# Patient Record
Sex: MALE | Race: WHITE
[De-identification: names, ages, dates, MRNs, and addresses within clinical notes are randomized per-mention and may not be internally consistent; named-entity substitution may affect disease eponyms.]

---

## 2021-12-17 NOTE — XR
EXAMINATION TYPE: XR ankle complete RT

 

DATE OF EXAM: 12/16/2021

 

COMPARISON: NONE

 

HISTORY: Pain

 

FINDINGS:

Three views of the ankle demonstrate the ankle mortise to be intact and symmetric.  The joint spaces 
are preserved.  The osseous structures are intact.  Spurring along the medial epicondyles. There is s
oft tissue edema and calcaneal spurs.

 

IMPRESSION:

1. No definite acute fracture or dislocation, if symptoms persist follow-up study in 7 to 10 days wou
ld be suggested.

## 2021-12-17 NOTE — XR
EXAMINATION TYPE: XR foot complete RT

 

DATE OF EXAM: 12/16/2021

 

COMPARISON: NONE

 

HISTORY: Pain

 

TECHNIQUE: Three views are submitted.

 

FINDINGS:

The osseous structures are intact.    There is no acute fracture or dislocation. Diffuse osteopenia. 
Arthropathy of the first MTP. Calcaneal spurs noted.

 

IMPRESSION:

1. No acute fractures symptoms persist EXAM 10-14 days recommended

## 2022-01-30 ENCOUNTER — HOSPITAL ENCOUNTER (OUTPATIENT)
Dept: HOSPITAL 47 - RADMRIMAIN | Age: 32
Discharge: HOME | End: 2022-01-30
Attending: PODIATRIST
Payer: COMMERCIAL

## 2022-01-30 DIAGNOSIS — M77.31: ICD-10-CM

## 2022-01-30 DIAGNOSIS — M19.071: Primary | ICD-10-CM

## 2022-01-30 NOTE — MR
EXAMINATION TYPE: MR ankle RT wo con

 

DATE OF EXAM: 1/30/2022

 

COMPARISON: None

 

HISTORY: Right ankle pain

 

Multiplanar multiecho imaging of the right ankle without contrast.

 

The Achilles tendon is intact. Plantar fascia appears intact. Medial and lateral flexor tendons of th
e ankle appear intact. Ankle mortise is anatomic. The talus is intact. The collateral ligaments appea
r intact. There is no evidence of any significant joint effusion. The bones of the hindfoot appear in
tact. There is mild plantar and Achilles calcaneal spurring. There is some spurring at the anterior t
alonavicular joint. The subtalar joint appears intact. There is no evidence of a soft tissue mass.

 

IMPRESSION:

There is some hypertrophic degenerative changes at the talonavicular joint. Calcaneal spurring. No ev
idence of ligamentous or tendon tear.

## 2022-05-27 ENCOUNTER — HOSPITAL ENCOUNTER (OUTPATIENT)
Dept: HOSPITAL 47 - OR | Age: 32
Discharge: HOME | End: 2022-05-27
Attending: PODIATRIST
Payer: COMMERCIAL

## 2022-05-27 VITALS — RESPIRATION RATE: 16 BRPM

## 2022-05-27 VITALS — BODY MASS INDEX: 48 KG/M2

## 2022-05-27 VITALS — HEART RATE: 60 BPM | DIASTOLIC BLOOD PRESSURE: 78 MMHG | SYSTOLIC BLOOD PRESSURE: 127 MMHG

## 2022-05-27 VITALS — TEMPERATURE: 96.9 F

## 2022-05-27 DIAGNOSIS — Q66.89: Primary | ICD-10-CM

## 2022-05-27 PROCEDURE — 76942 ECHO GUIDE FOR BIOPSY: CPT

## 2022-05-27 PROCEDURE — 64445 NJX AA&/STRD SCIATIC NRV IMG: CPT

## 2022-05-27 PROCEDURE — 28740 FUSION OF FOOT BONES: CPT

## 2022-05-27 PROCEDURE — 64447 NJX AA&/STRD FEMORAL NRV IMG: CPT

## 2022-05-27 PROCEDURE — 28725 ARTHRODESIS SUBTALAR: CPT

## 2022-05-27 NOTE — P.ANPRN
Procedure Note - Anesthesia





- Nerve Block Performed


  ** Right Popliteal Single


Time Out Performed: Yes


Date of Procedure: 05/27/22


Procedure Start Time: 07:02


Procedure Stop Time: 07:05


Location of Patient: PreOp


Indication: Acute Post-Operative Pain, Analgesia, Requested by Surgeon


Sedation Type: Sedate with meaningful contact maintained


Preparation: Sterile Prep


Position: Supine


Catheter: None


Needle Types: Pajunk


Needle Gauge: 20


Ultrasound used to visualize needle placement: Yes


Ultrasound used to observe medication spread: Yes


Injectate: 0.5% Ropivacaine (see comment for volume)


Blood Aspirated: No


Pain Paresthesia on Injection Noted: No


Resistance on Injection: Normal


Image Stored and Saved: Yes


Events: Uneventful and Well Tolerated

## 2022-05-27 NOTE — P.ANPRN
Procedure Note - Anesthesia





- Nerve Block Performed


  ** Right Adductor Canal Single


Time Out Performed: Yes


Date of Procedure: 05/27/22


Procedure Start Time: 06:53


Procedure Stop Time: 06:56


Location of Patient: PreOp


Indication: Acute Post-Operative Pain, Analgesia, Requested by Surgeon


Sedation Type: Sedate with meaningful contact maintained


Preparation: Sterile Prep


Position: Supine


Needle Types: Pajunk


Needle Gauge: 21


Ultrasound used to visualize needle placement: Yes


Ultrasound used to observe medication spread: Yes


Injectate: 0.5% Ropivacaine (see comment for volume) (15ml)


Blood Aspirated: No


Pain Paresthesia on Injection Noted: No


Resistance on Injection: Normal


Image Stored and Saved: Yes


Events: Uneventful and Well Tolerated

## 2022-05-27 NOTE — P.OP
Date of Procedure: 05/27/22


Preoperative Diagnosis: 


1.  Congenital subtalar coalition right foot


2.  Acquired deformity right foot


Postoperative Diagnosis: 


1.  Same


2.  Same


Procedure(s) Performed: 


Double arthrodesis right foot (talonavicular and subtalar joints)


Implants: 


7.0mm Novastep CREED screw x 2


5.0mm headless compression screw


20mm compression staple


Anesthesia: GETA


Estimated Blood Loss (ml): 3


Pathology: none sent


Condition: stable


Disposition: PACU


Description of Procedure: 


Prior to the patient being brought to the operating room anesthesia administered

a nerve block on the right lower extremity.  The patient was then brought into 

the operating room and placed on table in supine position.  Timeout was taken to

confirm correct patient identifiers, correct procedure and correct laterality of

surgery.  Once everyone in the room was in agreement with the timeout, the 

patient was induced and placed under general anesthesia.  A well-padded 

tourniquet was placed on the right thigh.  A bump was placed underneath the 

right hip to internally rotate the right leg and then the right leg was prepped 

and draped in usual manner.  The leg was exsanguinated with an Esmarch bandage 

and the leg elevated for 3 minutes and then the thigh tourniquet was inflated to

250 mmHg.


Attention was directed over the anterior lateral aspect of the hindfoot where a 

lazy S incision was made over the sinus tarsi.  Deepened under the subcutaneous 

tissue careful to identify, avoid, and retract any neurovascular structures and 

cauterize any bleeding vessels.  Blunt dissection was carried down to level of 

the subtalar joint capsule.  The capsule was incised and reflected to expose the

anterior portion subtalar joint.  Utilizing a combination of chisels and joint 

preparation instruments of the articular cartilage was removed from the 

calcaneus and talus.  Then a low-speed 3 mm bur was used to remove the rest 

articular cartilage and subchondral bone.  The same bur was also used to 

progressively fenestrate both surfaces to promote bleeding the area of bone 

healing.  The area was then thoroughly irrigated with sterile saline then 10 mL 

of demineralized bone matrix was placed between the arthrodesis segments.  Then 

guidewires for the 7.0 mm screws were positioned over the posterior plantar 

aspect the calcaneus.  Utilizing fluoroscopic visualization the pins were 

aligned starting just lateral to the midline and then angling slightly medial 

and anterior and superior to across the subtalar joint into the talar body.  

Once the first pin was and placed the first screw was positioned and tightened 

until the head engaged the calcaneus and all threads were clear the arthrodesis 

site to compress the subtalar joint.  Once that was completed fluoroscopy showed

that the subtalar joint was completely compressed.  A second wire for the same 

size screw was then placed plantar to the first again lateral to the midline and

ankle then anterior superior and medial direction.  The guidewire was directed 

towards the neck of the talus.  Once the pin was in place fluoroscopy was used 

to check positioning which was found to be appropriate and then another 7.0 mm 

screw was inserted across the guidewire and then fully tightened until the head 

engaged calcaneus and the distal threads were into the neck of the talus.  

Fluoroscopic imaging showed both screws were proper position both on the lateral

and AP views.  Then the subtalar joint was visually inspected and it was noted 

to be fully compressed.  The wound is irrigated with sterile saline.  Deep 

capsular closure was done with 0 Vicryl.  Superficial fascia closure done with 

2-0 Vicryl.  Subcu closure done for Monocryl.  Skin closure done with staples.


Then the wedge was removed from under the right hip so that the right foot 

externally rotate.  Then incision was made along the medial, foot over the 

talonavicular joint.  The incision was deepened down to the subcutaneous tissue 

careful to identify, avoid, and retract any neurovascular structures and 

cauterize any bleeding vessels.  Blunt dissection was continued down to level of

the fascia and capsule overlying the talonavicular joint.  The capsule and 

fascia was sharply incised and reflected dorsally and plantarly to expose the 

entirety of the joint.  A combination of ring preparation tools was used to 

remove the articular cartilage.  Then the same 3 mm low-speed bur was used to 

remove the remaining articular cartilage as well as the subchondral bone.  Once 

that was completed the joint was held in reduction and then the bur reinserted 

to plane the surfaces so there was flat bony contact.  Then the bur was used to 

progressively fenestrate both bony surfaces.  Once adequate resection was 

performed the wound is thoroughly irrigated with sterile saline.  Then 5 mL of 

demineralized bone matrix was placed between the arthrodesis segments.  Holding 

the talonavicular joint reduced a guidewire for 5.0 mm headless compression 

screw was placed at the navicular cuneiform joint medially and advanced across 

the talonavicular joint in a proximal dorsal and lateral direction.  Once the 

pin was inserted fluoroscopy was used to check the position both in AP and 

lateral views.  Once the pin was adequately placed drilling was done just past 

the arthrodesis site.  And then a 5.0 mm headless compression screw was placed 

over the wire and advanced until the threads on the head of the screw engaged 

the bone of the navicular and allowed for compression.  Fluoroscopy confirmed 

that the talonavicular joint was fully compressed and that the screw was fully 

seated.  Then drill holes for a 20 mm compression staple were placed dorsally 

along the talonavicular joint.  Once the drill holes were completed the staple 

was then inserted with the legs of the staple at 90.  Staple was fully seated 

and then the  released to allow for compression of the legs in stable.  

Then the staple was fully impacted so it sat flat against surface of the bone.  

Final fluoroscopic imaging showed that all hardware was properly placed and that

there was good compression noted at the arthrodesis sites.  The medial wound is 

flushed thoroughly with sterile saline.  Deep closure done with 0 Vicryl.  Subcu

closure was done with 4-0 Monocryl.  Skin closure was done with staples.  An 

Arthrex jumpstart dressings were placed over both incisions.  A bulky dry 

dressings applied to the right foot and ankle.  The tourniquet was released and 

capillary refill return to all digits on the right foot.  She was then placed in

a well-padded, well molded plaster posterior mold/sugar tong splint.  The foot 

and ankle were held in neutral position until the splint was fully dried.  At 

that point anesthesia was reversed and the patient was taken recovery with vital

signs stable.

## 2022-11-14 ENCOUNTER — HOSPITAL ENCOUNTER (EMERGENCY)
Dept: HOSPITAL 47 - EC | Age: 32
Discharge: HOME | End: 2022-11-14
Payer: COMMERCIAL

## 2022-11-14 VITALS — SYSTOLIC BLOOD PRESSURE: 154 MMHG | HEART RATE: 96 BPM | DIASTOLIC BLOOD PRESSURE: 86 MMHG

## 2022-11-14 VITALS — TEMPERATURE: 99.1 F

## 2022-11-14 VITALS — RESPIRATION RATE: 18 BRPM

## 2022-11-14 DIAGNOSIS — Z79.899: ICD-10-CM

## 2022-11-14 DIAGNOSIS — K21.9: ICD-10-CM

## 2022-11-14 DIAGNOSIS — Z87.891: ICD-10-CM

## 2022-11-14 DIAGNOSIS — I48.91: ICD-10-CM

## 2022-11-14 DIAGNOSIS — U07.1: Primary | ICD-10-CM

## 2022-11-14 LAB
ALBUMIN SERPL-MCNC: 4 G/DL (ref 3.5–5)
ALP SERPL-CCNC: 71 U/L (ref 38–126)
ALT SERPL-CCNC: 80 U/L (ref 4–49)
ANION GAP SERPL CALC-SCNC: 5 MMOL/L
APTT BLD: 24.4 SEC (ref 22–30)
AST SERPL-CCNC: 59 U/L (ref 17–59)
BASOPHILS # BLD AUTO: 0 K/UL (ref 0–0.2)
BASOPHILS NFR BLD AUTO: 0 %
BUN SERPL-SCNC: 12 MG/DL (ref 9–20)
CALCIUM SPEC-MCNC: 8.9 MG/DL (ref 8.4–10.2)
CHLORIDE SERPL-SCNC: 105 MMOL/L (ref 98–107)
CO2 SERPL-SCNC: 25 MMOL/L (ref 22–30)
EOSINOPHIL # BLD AUTO: 0.3 K/UL (ref 0–0.7)
EOSINOPHIL NFR BLD AUTO: 3 %
ERYTHROCYTE [DISTWIDTH] IN BLOOD BY AUTOMATED COUNT: 4.75 M/UL (ref 4.3–5.9)
ERYTHROCYTE [DISTWIDTH] IN BLOOD: 12.7 % (ref 11.5–15.5)
GLUCOSE SERPL-MCNC: 99 MG/DL (ref 74–99)
HCT VFR BLD AUTO: 39.2 % (ref 39–53)
HGB BLD-MCNC: 13 GM/DL (ref 13–17.5)
INR PPP: 0.9 (ref ?–1.2)
LYMPHOCYTES # SPEC AUTO: 0.6 K/UL (ref 1–4.8)
LYMPHOCYTES NFR SPEC AUTO: 8 %
MCH RBC QN AUTO: 27.4 PG (ref 25–35)
MCHC RBC AUTO-ENTMCNC: 33.3 G/DL (ref 31–37)
MCV RBC AUTO: 82.4 FL (ref 80–100)
MONOCYTES # BLD AUTO: 0.5 K/UL (ref 0–1)
MONOCYTES NFR BLD AUTO: 7 %
NEUTROPHILS # BLD AUTO: 6.3 K/UL (ref 1.3–7.7)
NEUTROPHILS NFR BLD AUTO: 80 %
PLATELET # BLD AUTO: 283 K/UL (ref 150–450)
POTASSIUM SERPL-SCNC: 4 MMOL/L (ref 3.5–5.1)
PROT SERPL-MCNC: 7 G/DL (ref 6.3–8.2)
PT BLD: 10.1 SEC (ref 9–12)
SODIUM SERPL-SCNC: 135 MMOL/L (ref 137–145)
WBC # BLD AUTO: 7.9 K/UL (ref 3.8–10.6)

## 2022-11-14 PROCEDURE — 99285 EMERGENCY DEPT VISIT HI MDM: CPT

## 2022-11-14 PROCEDURE — 93005 ELECTROCARDIOGRAM TRACING: CPT

## 2022-11-14 PROCEDURE — 80053 COMPREHEN METABOLIC PANEL: CPT

## 2022-11-14 PROCEDURE — 85730 THROMBOPLASTIN TIME PARTIAL: CPT

## 2022-11-14 PROCEDURE — 85610 PROTHROMBIN TIME: CPT

## 2022-11-14 PROCEDURE — 71046 X-RAY EXAM CHEST 2 VIEWS: CPT

## 2022-11-14 PROCEDURE — 83605 ASSAY OF LACTIC ACID: CPT

## 2022-11-14 PROCEDURE — 96375 TX/PRO/DX INJ NEW DRUG ADDON: CPT

## 2022-11-14 PROCEDURE — 85025 COMPLETE CBC W/AUTO DIFF WBC: CPT

## 2022-11-14 PROCEDURE — 87502 INFLUENZA DNA AMP PROBE: CPT

## 2022-11-14 PROCEDURE — 84484 ASSAY OF TROPONIN QUANT: CPT

## 2022-11-14 PROCEDURE — 36415 COLL VENOUS BLD VENIPUNCTURE: CPT

## 2022-11-14 PROCEDURE — 87635 SARS-COV-2 COVID-19 AMP PRB: CPT

## 2022-11-14 PROCEDURE — 96374 THER/PROPH/DIAG INJ IV PUSH: CPT

## 2022-11-14 NOTE — ED
SOB HPI





- General


Chief Complaint: Shortness of Breath


Stated Complaint: Chest Pain,SOB


Time Seen by Provider: 11/14/22 15:25


Source: patient


Mode of arrival: wheelchair


Limitations: no limitations





- History of Present Illness


Initial Comments: 


Patient is a 32-year-old male with past medical history of atrial fibrillation 

and SVT who presents to the emergency department with a chief complaint of 

shortness of breath.  Patient states symptoms started 4 days ago.  Reports 

shortness of breath is mostly exertional until today when he began to feel short

of breath at rest.  He also reports intermittent chest pain in the middle of his

chest.  Describes it as sharp and worse with coughing, sneezing, and breathing. 

It is nonexertional and nonradiating.  Denies lightheadedness, dizziness, 

palpitations.  Does admit to cough.  Denies other upper respiratory symptoms, 

fever, nausea, vomiting.  Patient does have history of asthma.  States he uses 

his rescue inhaler a couple times a year.  Denies past and present use of 

tobacco.  Denies family history of cardiac disease. 





- Related Data


                                Home Medications











 Medication  Instructions  Recorded  Confirmed


 


Albuterol Inhaler [Ventolin Hfa 1 - 2 puff INHALATION RT-Q6H PRN 05/19/22 11/14/22





Inhaler]   


 


Metoprolol Succinate (ER) [Toprol 50 mg PO DAILY 11/14/22 11/14/22





Xl]   








                                  Previous Rx's











 Medication  Instructions  Recorded


 


Ibuprofen [Motrin] 800 mg PO Q6HR #30 tab 11/14/22


 


methylPREDNISolone [Medrol Dose 4 mg PO AS DIRECTED #1 packet 11/14/22





Pack]  











                                    Allergies











Allergy/AdvReac Type Severity Reaction Status Date / Time


 


No Known Allergies Allergy   Verified 11/14/22 16:20














Review of Systems


ROS Statement: 


Those systems with pertinent positive or pertinent negative responses have been 

documented in the HPI.





ROS Other: All systems not noted in ROS Statement are negative.





Past Medical History


Past Medical History: Atrial Fibrillation, Asthma, GERD/Reflux, Supraventricular

Tachycardia (SVT)


History of Any Multi-Drug Resistant Organisms: None Reported


Past Surgical History: Cardiac Ablation


Past Anesthesia/Blood Transfusion Reactions: Motion Sickness


Past Psychological History: No Psychological Hx Reported


Smoking Status: Former smoker


Past Alcohol Use History: Rare


Past Drug Use History: None Reported





- Past Family History


  ** Father


Family Medical History: Cancer





General Exam


Limitations: no limitations


General appearance: alert, in no apparent distress


Respiratory exam: Present: normal lung sounds bilaterally.  Absent: respiratory 

distress, wheezes, rales, rhonchi, stridor, chest wall tenderness


Cardiovascular Exam: Present: regular rate, normal rhythm, normal heart sounds. 

Absent: systolic murmur, diastolic murmur, rubs, gallop, clicks


GI/Abdominal exam: Present: soft, normal bowel sounds.  Absent: distended, 

tenderness, guarding, rebound, rigid


Extremities exam: Present: normal inspection, normal capillary refill


Neurological exam: Present: alert, oriented X3, CN II-XII intact


Psychiatric exam: Present: normal affect, normal mood


Skin exam: Present: warm, dry, intact, normal color.  Absent: rash





Course


                                   Vital Signs











  11/14/22 11/14/22 11/14/22





  15:20 17:37 18:34


 


Temperature 99.4 F  


 


Pulse Rate 101 H 93 96


 


Respiratory 20 18 18





Rate   


 


Blood Pressure 158/93 189/98 154/86


 


O2 Sat by Pulse 100 99 98





Oximetry   














  11/14/22





  18:44


 


Temperature 99.1 F


 


Pulse Rate 


 


Respiratory 





Rate 


 


Blood Pressure 


 


O2 Sat by Pulse 





Oximetry 














Medical Decision Making





- Medical Decision Making


This is a 32-year-old male presenting with shortness of breath.  Patient looks 

well and in no apparent distress.  No hypoxia or increased work of breathing.  

No tachypnea or tachycardia.  No wheezing or other abnormal lung sounds.








EKG obtained and interpreted by me which shows sinus rhythm with no new ST 

segment or T-wave abnormalities. I did compare this EKG with previous in May.  

At this time patient was evaluated by cardiology due to incidental abnormal EKG 

findings.  These included ST elevation in the lateral leads and T-wave 

inversions in leads III and aVF.  These findings were found to be nonacute.  

Echocardiogram revealed EF 55-60% Patient was cleared by cardiology.








Laboratory studies obtained.  Troponin is within normal limits.  COVID-19 is 

detected.  Other laboratory studies were relatively unremarkable.  Chest x-ray 

obtained and interpreted by me which shows bronchitis and bilateral pleural 

based thickening.








Patient given Solu-Medrol and Toradol.  Results discussed with patient.  Patient

likely presenting chest pain related to pleurisy due to COVID-19 infection.  

He'll be discharged with Motrin 800 for pleurisy.  With his history of asthma 

I'll also send him home with a Medrol Dosepak.  Return parameters discussed.  

Patient verbalizes understanding and is medically stable for discharge.








Dr. Nelson is my attending.

















- Lab Data


Result diagrams: 


                                 11/14/22 15:40





                                 11/14/22 15:40


                                   Lab Results











  11/14/22 11/14/22 11/14/22 Range/Units





  15:40 15:40 15:40 


 


WBC  7.9    (3.8-10.6)  k/uL


 


RBC  4.75    (4.30-5.90)  m/uL


 


Hgb  13.0    (13.0-17.5)  gm/dL


 


Hct  39.2    (39.0-53.0)  %


 


MCV  82.4    (80.0-100.0)  fL


 


MCH  27.4    (25.0-35.0)  pg


 


MCHC  33.3    (31.0-37.0)  g/dL


 


RDW  12.7    (11.5-15.5)  %


 


Plt Count  283    (150-450)  k/uL


 


MPV  8.0    


 


Neutrophils %  80    %


 


Lymphocytes %  8    %


 


Monocytes %  7    %


 


Eosinophils %  3    %


 


Basophils %  0    %


 


Neutrophils #  6.3    (1.3-7.7)  k/uL


 


Lymphocytes #  0.6 L    (1.0-4.8)  k/uL


 


Monocytes #  0.5    (0-1.0)  k/uL


 


Eosinophils #  0.3    (0-0.7)  k/uL


 


Basophils #  0.0    (0-0.2)  k/uL


 


PT   10.1   (9.0-12.0)  sec


 


INR   0.9   (<1.2)  


 


APTT   24.4   (22.0-30.0)  sec


 


Sodium    135 L  (137-145)  mmol/L


 


Potassium    4.0  (3.5-5.1)  mmol/L


 


Chloride    105  ()  mmol/L


 


Carbon Dioxide    25  (22-30)  mmol/L


 


Anion Gap    5  mmol/L


 


BUN    12  (9-20)  mg/dL


 


Creatinine    0.83  (0.66-1.25)  mg/dL


 


Est GFR (CKD-EPI)AfAm    >90  (>60 ml/min/1.73 sqM)  


 


Est GFR (CKD-EPI)NonAf    >90  (>60 ml/min/1.73 sqM)  


 


Glucose    99  (74-99)  mg/dL


 


Plasma Lactic Acid Ant     (0.7-2.0)  mmol/L


 


Calcium    8.9  (8.4-10.2)  mg/dL


 


Total Bilirubin    0.5  (0.2-1.3)  mg/dL


 


AST    59  (17-59)  U/L


 


ALT    80 H  (4-49)  U/L


 


Alkaline Phosphatase    71  ()  U/L


 


Troponin I     (0.000-0.034)  ng/mL


 


Total Protein    7.0  (6.3-8.2)  g/dL


 


Albumin    4.0  (3.5-5.0)  g/dL


 


Coronavirus (PCR)     (Not Detectd)  


 


Influenza Type A RNA     (Not Detectd)  


 


Influenza Type B (PCR)     (Not Detectd)  














  11/14/22 11/14/22 11/14/22 Range/Units





  15:40 15:40 15:40 


 


WBC     (3.8-10.6)  k/uL


 


RBC     (4.30-5.90)  m/uL


 


Hgb     (13.0-17.5)  gm/dL


 


Hct     (39.0-53.0)  %


 


MCV     (80.0-100.0)  fL


 


MCH     (25.0-35.0)  pg


 


MCHC     (31.0-37.0)  g/dL


 


RDW     (11.5-15.5)  %


 


Plt Count     (150-450)  k/uL


 


MPV     


 


Neutrophils %     %


 


Lymphocytes %     %


 


Monocytes %     %


 


Eosinophils %     %


 


Basophils %     %


 


Neutrophils #     (1.3-7.7)  k/uL


 


Lymphocytes #     (1.0-4.8)  k/uL


 


Monocytes #     (0-1.0)  k/uL


 


Eosinophils #     (0-0.7)  k/uL


 


Basophils #     (0-0.2)  k/uL


 


PT     (9.0-12.0)  sec


 


INR     (<1.2)  


 


APTT     (22.0-30.0)  sec


 


Sodium     (137-145)  mmol/L


 


Potassium     (3.5-5.1)  mmol/L


 


Chloride     ()  mmol/L


 


Carbon Dioxide     (22-30)  mmol/L


 


Anion Gap     mmol/L


 


BUN     (9-20)  mg/dL


 


Creatinine     (0.66-1.25)  mg/dL


 


Est GFR (CKD-EPI)AfAm     (>60 ml/min/1.73 sqM)  


 


Est GFR (CKD-EPI)NonAf     (>60 ml/min/1.73 sqM)  


 


Glucose     (74-99)  mg/dL


 


Plasma Lactic Acid Ant  1.0    (0.7-2.0)  mmol/L


 


Calcium     (8.4-10.2)  mg/dL


 


Total Bilirubin     (0.2-1.3)  mg/dL


 


AST     (17-59)  U/L


 


ALT     (4-49)  U/L


 


Alkaline Phosphatase     ()  U/L


 


Troponin I   <0.012   (0.000-0.034)  ng/mL


 


Total Protein     (6.3-8.2)  g/dL


 


Albumin     (3.5-5.0)  g/dL


 


Coronavirus (PCR)     (Not Detectd)  


 


Influenza Type A RNA    Not Detected  (Not Detectd)  


 


Influenza Type B (PCR)    Not Detected  (Not Detectd)  














  11/14/22 Range/Units





  15:40 


 


WBC   (3.8-10.6)  k/uL


 


RBC   (4.30-5.90)  m/uL


 


Hgb   (13.0-17.5)  gm/dL


 


Hct   (39.0-53.0)  %


 


MCV   (80.0-100.0)  fL


 


MCH   (25.0-35.0)  pg


 


MCHC   (31.0-37.0)  g/dL


 


RDW   (11.5-15.5)  %


 


Plt Count   (150-450)  k/uL


 


MPV   


 


Neutrophils %   %


 


Lymphocytes %   %


 


Monocytes %   %


 


Eosinophils %   %


 


Basophils %   %


 


Neutrophils #   (1.3-7.7)  k/uL


 


Lymphocytes #   (1.0-4.8)  k/uL


 


Monocytes #   (0-1.0)  k/uL


 


Eosinophils #   (0-0.7)  k/uL


 


Basophils #   (0-0.2)  k/uL


 


PT   (9.0-12.0)  sec


 


INR   (<1.2)  


 


APTT   (22.0-30.0)  sec


 


Sodium   (137-145)  mmol/L


 


Potassium   (3.5-5.1)  mmol/L


 


Chloride   ()  mmol/L


 


Carbon Dioxide   (22-30)  mmol/L


 


Anion Gap   mmol/L


 


BUN   (9-20)  mg/dL


 


Creatinine   (0.66-1.25)  mg/dL


 


Est GFR (CKD-EPI)AfAm   (>60 ml/min/1.73 sqM)  


 


Est GFR (CKD-EPI)NonAf   (>60 ml/min/1.73 sqM)  


 


Glucose   (74-99)  mg/dL


 


Plasma Lactic Acid Ant   (0.7-2.0)  mmol/L


 


Calcium   (8.4-10.2)  mg/dL


 


Total Bilirubin   (0.2-1.3)  mg/dL


 


AST   (17-59)  U/L


 


ALT   (4-49)  U/L


 


Alkaline Phosphatase   ()  U/L


 


Troponin I   (0.000-0.034)  ng/mL


 


Total Protein   (6.3-8.2)  g/dL


 


Albumin   (3.5-5.0)  g/dL


 


Coronavirus (PCR)  Detected A  (Not Detectd)  


 


Influenza Type A RNA   (Not Detectd)  


 


Influenza Type B (PCR)   (Not Detectd)  














Disposition


Clinical Impression: 


 COVID-19, SOB (shortness of breath), Chest pain, Pleurisy





Disposition: HOME SELF-CARE


Condition: Good


Instructions (If sedation given, give patient instructions):  Coronavirus 

Disease 2019 (COVID-19), Pleurisy (ED)


Additional Instructions: 


Take medication as directed.  The steroid will be started tomorrow.  Quarantine 

for 5 days. Return to the emergency department experience new, concerning, or 

worsening symptoms.


Prescriptions: 


methylPREDNISolone [Medrol Dose Pack] 4 mg PO AS DIRECTED #1 packet


Ibuprofen [Motrin] 800 mg PO Q6HR #30 tab


Is patient prescribed a controlled substance at d/c from ED?: No


Referrals: 


None,Stated [Primary Care Provider] - 1-2 days


Time of Disposition: 17:52

## 2022-11-14 NOTE — XR
EXAMINATION TYPE: XR chest 2V

 

DATE OF EXAM: 11/14/2022

 

COMPARISON: NONE

 

TECHNIQUE: PA and lateral views submitted.

 

HISTORY: Chest pain

 

FINDINGS:

The lungs are clear and  there is no pneumothorax, pleural effusion, or focal pneumonia. Bilateral pl
eural thickening with coarsened interstitium. Heart size mildly prominent for the patient's age group
. Apical pleural thickening.

 

IMPRESSION:

1. Correlate for bronchitis or mild interstitial pneumonitis.

2. Bilateral pleural-based thickening is sometimes associated with pleurisy correlate clinically.

## 2022-11-19 ENCOUNTER — HOSPITAL ENCOUNTER (EMERGENCY)
Dept: HOSPITAL 47 - EC | Age: 32
Discharge: HOME | End: 2022-11-19
Payer: COMMERCIAL

## 2022-11-19 VITALS — DIASTOLIC BLOOD PRESSURE: 84 MMHG | TEMPERATURE: 98.9 F | HEART RATE: 81 BPM | SYSTOLIC BLOOD PRESSURE: 150 MMHG

## 2022-11-19 VITALS — RESPIRATION RATE: 18 BRPM

## 2022-11-19 DIAGNOSIS — Z87.891: ICD-10-CM

## 2022-11-19 DIAGNOSIS — J45.909: ICD-10-CM

## 2022-11-19 DIAGNOSIS — I48.91: ICD-10-CM

## 2022-11-19 DIAGNOSIS — Z79.899: ICD-10-CM

## 2022-11-19 DIAGNOSIS — K21.9: ICD-10-CM

## 2022-11-19 DIAGNOSIS — U07.1: Primary | ICD-10-CM

## 2022-11-19 PROCEDURE — 71046 X-RAY EXAM CHEST 2 VIEWS: CPT

## 2022-11-19 PROCEDURE — 93005 ELECTROCARDIOGRAM TRACING: CPT

## 2022-11-19 PROCEDURE — 99283 EMERGENCY DEPT VISIT LOW MDM: CPT

## 2022-11-19 NOTE — XR
EXAMINATION TYPE: XR chest 2V

 

DATE OF EXAM: 11/19/2022

 

COMPARISON: 11/14/2022

 

HISTORY: Chest pain

 

TECHNIQUE: 

 

FINDINGS:

Heart and mediastinum are normal. Lungs are clear. Diaphragm is normal. Bony thorax is intact. There 
is some pleural thickening right lateral chest wall that is likely lipomatosis.

 

 

IMPRESSION: Normal chest. No change.

## 2022-11-19 NOTE — ED
URI HPI





- General


Chief Complaint: Upper Respiratory Infection


Stated Complaint: covid +


Time Seen by Provider: 11/19/22 15:49


Source: patient, RN notes reviewed


Mode of arrival: ambulatory


Limitations: no limitations





- History of Present Illness


Initial Comments: 


Patient is a 32 year old male presenting to the ER with a chief complaint of 

lightheadedness and shakes. Patient was diagnosed with COVID-19 here on Monday, 

11/14/22. Patient reports he was sent home with steroids and ibuprofen. He has 

been taking them. He states he still has all the same symptoms as Monday 

including chills, nightsweats, dry cough, and congestion. He states today while 

he was walking up stairs he became short of breath and lightheaded. He also 

become very shaky which prompted his visit to the ER. He denies chest 

pain/palpitations, abdominal pain, or change in bowel habits. 








- Related Data


                                Home Medications











 Medication  Instructions  Recorded  Confirmed


 


Albuterol Inhaler [Ventolin Hfa 1 - 2 puff INHALATION RT-Q6H PRN 05/19/22 11/14/22





Inhaler]   


 


Metoprolol Succinate (ER) [Toprol 50 mg PO DAILY 11/14/22 11/14/22





Xl]   








                                  Previous Rx's











 Medication  Instructions  Recorded


 


Ibuprofen [Motrin] 800 mg PO Q6HR #30 tab 11/14/22


 


methylPREDNISolone [Medrol Dose 4 mg PO AS DIRECTED #1 packet 11/14/22





Pack]  











                                    Allergies











Allergy/AdvReac Type Severity Reaction Status Date / Time


 


No Known Allergies Allergy   Verified 11/19/22 15:45














Review of Systems


ROS Statement: 


Those systems with pertinent positive or pertinent negative responses have been 

documented in the HPI.





ROS Other: All systems not noted in ROS Statement are negative.





Past Medical History


Past Medical History: Atrial Fibrillation, Asthma, GERD/Reflux, Supraventricular

Tachycardia (SVT)


History of Any Multi-Drug Resistant Organisms: None Reported


Past Surgical History: Cardiac Ablation


Past Anesthesia/Blood Transfusion Reactions: Motion Sickness


Past Psychological History: No Psychological Hx Reported


Smoking Status: Former smoker


Past Alcohol Use History: Rare


Past Drug Use History: None Reported





- Past Family History


  ** Father


Family Medical History: Cancer





General Exam


Limitations: no limitations


General appearance: alert, in no apparent distress


Head exam: Present: atraumatic, normocephalic, normal inspection


Eye exam: Present: normal appearance, PERRL, EOMI.  Absent: scleral icterus, 

conjunctival injection, periorbital swelling


ENT exam: Present: normal exam, mucous membranes moist


Neck exam: Present: normal inspection.  Absent: tenderness, meningismus, 

lymphadenopathy


Respiratory exam: Present: normal lung sounds bilaterally, other (cough).  

Absent: respiratory distress, wheezes, rales, rhonchi, stridor


Cardiovascular Exam: Present: regular rate, normal rhythm, normal heart sounds. 

Absent: systolic murmur, diastolic murmur, rubs, gallop, clicks


GI/Abdominal exam: Present: soft, normal bowel sounds.  Absent: distended, 

tenderness, guarding, rebound, rigid


Extremities exam: Present: normal inspection, full ROM, normal capillary refill.

 Absent: tenderness, pedal edema, joint swelling, calf tenderness


Back exam: Present: normal inspection


Neurological exam: Present: alert, oriented X3, CN II-XII intact


Psychiatric exam: Present: normal affect, normal mood


Skin exam: Present: warm, dry, intact, normal color.  Absent: rash





Course


                                   Vital Signs











  11/19/22 11/19/22





  15:41 16:09


 


Temperature 98.6 F 


 


Pulse Rate 82 


 


Respiratory 18 18





Rate  


 


Blood Pressure 153/87 


 


O2 Sat by Pulse 98 





Oximetry  














Medical Decision Making





- Medical Decision Making


Chest x-rays unremarkable, EKG does not show any acute changes.  Patient had an 

episode of lightheadedness which has resolved.  He is asymptomatic.  Patient 

will be discharged in stable condition return parameters were discussed.  

Patient is provided a work no for tomorrow.





Disposition


Clinical Impression: 


 COVID-19, Lightheaded





Disposition: HOME SELF-CARE


Condition: Stable


Instructions (If sedation given, give patient instructions):  Upper Respiratory 

Infection (ED)


Additional Instructions: 


Please return to the Emergency Department if symptoms worsen or any other c

oncerns.


Is patient prescribed a controlled substance at d/c from ED?: No


Referrals: 


None,Stated [Primary Care Provider] - 1-2 days


Time of Disposition: 16:41

## 2024-09-14 ENCOUNTER — HOSPITAL ENCOUNTER (EMERGENCY)
Dept: HOSPITAL 47 - EC | Age: 34
Discharge: HOME | End: 2024-09-14
Payer: COMMERCIAL

## 2024-09-14 VITALS — SYSTOLIC BLOOD PRESSURE: 105 MMHG | DIASTOLIC BLOOD PRESSURE: 66 MMHG | HEART RATE: 70 BPM | TEMPERATURE: 98.5 F

## 2024-09-14 VITALS — RESPIRATION RATE: 18 BRPM

## 2024-09-14 LAB
ALBUMIN SERPL-MCNC: 3.8 G/DL (ref 3.5–5)
ALP SERPL-CCNC: 56 U/L (ref 38–126)
ALT SERPL-CCNC: 159 U/L (ref 4–49)
ANION GAP SERPL CALC-SCNC: 11 MMOL/L
APTT BLD: 28.7 SEC (ref 22–30)
AST SERPL-CCNC: 91 U/L (ref 17–59)
BASOPHILS # BLD AUTO: 0 K/UL (ref 0–0.2)
BASOPHILS NFR BLD AUTO: 0 %
BILIRUB UR QL STRIP.AUTO: (no result)
BUN SERPL-SCNC: 20 MG/DL (ref 9–20)
CALCIUM SPEC-MCNC: 9.1 MG/DL (ref 8.4–10.2)
CHLORIDE SERPL-SCNC: 92 MMOL/L (ref 98–107)
CO2 SERPL-SCNC: 28 MMOL/L (ref 22–30)
EOSINOPHIL # BLD AUTO: 0 K/UL (ref 0–0.7)
EOSINOPHIL NFR BLD AUTO: 0 %
ERYTHROCYTE [DISTWIDTH] IN BLOOD BY AUTOMATED COUNT: 5.16 M/UL (ref 4.3–5.9)
ERYTHROCYTE [DISTWIDTH] IN BLOOD: 12.8 % (ref 11.5–15.5)
GLUCOSE SERPL-MCNC: 118 MG/DL (ref 74–99)
HCT VFR BLD AUTO: 43 % (ref 39–53)
HGB BLD-MCNC: 14 GM/DL (ref 13–17.5)
HYALINE CASTS UR QL AUTO: 11 /LPF (ref 0–2)
INR PPP: 1 (ref ?–1.2)
LYMPHOCYTES # SPEC AUTO: 1.2 K/UL (ref 1–4.8)
LYMPHOCYTES NFR SPEC AUTO: 11 %
MAGNESIUM SPEC-SCNC: 1.8 MG/DL (ref 1.6–2.3)
MCH RBC QN AUTO: 27.2 PG (ref 25–35)
MCHC RBC AUTO-ENTMCNC: 32.6 G/DL (ref 31–37)
MCV RBC AUTO: 83.4 FL (ref 80–100)
MIXED CELL CASTS UR QL COMP ASSIST: 4 /LPF
MONOCYTES # BLD AUTO: 1 K/UL (ref 0–1)
MONOCYTES NFR BLD AUTO: 9 %
NEUTROPHILS # BLD AUTO: 8.6 K/UL (ref 1.3–7.7)
NEUTROPHILS NFR BLD AUTO: 77 %
NT-PROBNP SERPL-MCNC: <20 PG/ML
PH UR: 5.5 [PH] (ref 5–8)
PLATELET # BLD AUTO: 342 K/UL (ref 150–450)
POTASSIUM SERPL-SCNC: 3.6 MMOL/L (ref 3.5–5.1)
PROT SERPL-MCNC: 7.2 G/DL (ref 6.3–8.2)
PROT UR QL: (no result)
PT BLD: 11.3 SEC (ref 10–12.5)
RBC UR QL: 12 /HPF (ref 0–5)
SODIUM SERPL-SCNC: 131 MMOL/L (ref 137–145)
SP GR UR: 1.03 (ref 1–1.03)
SQUAMOUS UR QL AUTO: <1 /HPF (ref 0–4)
UROBILINOGEN UR QL STRIP: 6 MG/DL (ref ?–2)
WBC # BLD AUTO: 11.2 K/UL (ref 3.8–10.6)
WBC #/AREA URNS HPF: 8 /HPF (ref 0–5)

## 2024-09-14 PROCEDURE — 87636 SARSCOV2 & INF A&B AMP PRB: CPT

## 2024-09-14 PROCEDURE — 80053 COMPREHEN METABOLIC PANEL: CPT

## 2024-09-14 PROCEDURE — 83880 ASSAY OF NATRIURETIC PEPTIDE: CPT

## 2024-09-14 PROCEDURE — 96372 THER/PROPH/DIAG INJ SC/IM: CPT

## 2024-09-14 PROCEDURE — 87040 BLOOD CULTURE FOR BACTERIA: CPT

## 2024-09-14 PROCEDURE — 99284 EMERGENCY DEPT VISIT MOD MDM: CPT

## 2024-09-14 PROCEDURE — 85610 PROTHROMBIN TIME: CPT

## 2024-09-14 PROCEDURE — 84100 ASSAY OF PHOSPHORUS: CPT

## 2024-09-14 PROCEDURE — 81001 URINALYSIS AUTO W/SCOPE: CPT

## 2024-09-14 PROCEDURE — 85730 THROMBOPLASTIN TIME PARTIAL: CPT

## 2024-09-14 PROCEDURE — 84484 ASSAY OF TROPONIN QUANT: CPT

## 2024-09-14 PROCEDURE — 96365 THER/PROPH/DIAG IV INF INIT: CPT

## 2024-09-14 PROCEDURE — 87651 STREP A DNA AMP PROBE: CPT

## 2024-09-14 PROCEDURE — 83605 ASSAY OF LACTIC ACID: CPT

## 2024-09-14 PROCEDURE — 93005 ELECTROCARDIOGRAM TRACING: CPT

## 2024-09-14 PROCEDURE — 85025 COMPLETE CBC W/AUTO DIFF WBC: CPT

## 2024-09-14 PROCEDURE — 71046 X-RAY EXAM CHEST 2 VIEWS: CPT

## 2024-09-14 PROCEDURE — 36415 COLL VENOUS BLD VENIPUNCTURE: CPT

## 2024-09-14 PROCEDURE — 83735 ASSAY OF MAGNESIUM: CPT

## 2024-09-14 PROCEDURE — 96361 HYDRATE IV INFUSION ADD-ON: CPT

## 2024-09-14 RX ADMIN — ACETAMINOPHEN STA MG: 500 TABLET ORAL at 17:15

## 2024-09-14 RX ADMIN — ONDANSETRON STA MG: 4 TABLET, ORALLY DISINTEGRATING ORAL at 17:15

## 2024-09-14 RX ADMIN — AMPICILLIN SODIUM AND SULBACTAM SODIUM STA MLS/HR: 2; 1 INJECTION, POWDER, FOR SOLUTION INTRAMUSCULAR; INTRAVENOUS at 19:05

## 2024-09-14 RX ADMIN — KETOROLAC TROMETHAMINE STA MG: 15 INJECTION, SOLUTION INTRAMUSCULAR; INTRAVENOUS at 17:16

## 2024-09-14 RX ADMIN — CEFAZOLIN STA MLS/HR: 330 INJECTION, POWDER, FOR SOLUTION INTRAMUSCULAR; INTRAVENOUS at 18:16

## 2024-09-14 RX ADMIN — AMOXICILLIN AND CLAVULANATE POTASSIUM STA EACH: 875; 125 TABLET, FILM COATED ORAL at 19:06

## 2024-09-14 RX ADMIN — IBUPROFEN STA MG: 800 TABLET, FILM COATED ORAL at 17:15

## 2024-09-14 NOTE — XR
EXAMINATION TYPE: XR chest 2V

 

DATE OF EXAM: 9/14/2024

 

COMPARISON: 11/19/2022

 

HISTORY: 34-year-old male with fever and sore throat

 

TECHNIQUE:  AP and lateral views

 

FINDINGS:  

Heart upper limits of normal in size, likely accentuated due to technique. Hazy densities relating to
 body habitus. No consolidation or pleural effusion.

 

 

IMPRESSION:  

No acute process seen.

 

[facility], Workstation: [id number], 9/14/2024 6:40 PM

## 2024-09-14 NOTE — ED
Fever HPI





- General


Chief Complaint: Upper Respiratory Infection


Stated Complaint: sore throat, lethargy


Time Seen by Provider: 09/14/24 16:48


Source: patient, family, RN notes reviewed, old records reviewed


Mode of arrival: ambulatory


Limitations: no limitations





- History of Present Illness


Initial Comments: 





This is a 34-year-old male to ER for evaluation of fever and sore throat today. 

Patient was with friends 2 days ago playing some video games and he noticed he 

had a sore throat that been increasing since.  Patient states he feels sweaty 

with body aches and pains but no other acute complaints no cough congestion 

nausea vomiting or diarrhea


MD Complaint: fever, malaise, weakness, other (Sore throat)


Temperature Source: subjective


Context: sick contacts, multiple patients with similar symptoms


Associated Symptoms: chills, rigors, myalgias, sore throat


Treatments Prior to Arrival: none





- Related Data


                                Home Medications











 Medication  Instructions  Recorded  Confirmed


 


Albuterol Inhaler [Ventolin Hfa 1 - 2 puff INHALATION RT-Q6H PRN 05/19/22 11/14/22





Inhaler]   


 


Metoprolol Succinate (ER) [Toprol 50 mg PO DAILY 11/14/22 11/14/22





Xl]   








                                  Previous Rx's











 Medication  Instructions  Recorded


 


Ibuprofen [Motrin] 800 mg PO Q6HR #30 tab 11/14/22


 


methylPREDNISolone [Medrol Dose 4 mg PO AS DIRECTED #1 packet 11/14/22





Pack]  


 


Amoxic-Pot Clav 875-125Mg 1 tab PO BID 10 Days #20 tab 09/14/24





[Augmentin 875-125]  


 


Ketorolac [Toradol] 10 mg PO Q6HR #20 tab 09/18/24


 


Nystatin [Nystatin Oral Susp] 4 ml PO QID #200 ml 09/18/24











                                    Allergies











Allergy/AdvReac Type Severity Reaction Status Date / Time


 


No Known Allergies Allergy   Verified 09/18/24 16:01














Review of Systems


ROS Statement: 


Those systems with pertinent positive or pertinent negative responses have been 

documented in the HPI.





ROS Other: All systems not noted in ROS Statement are negative.





Past Medical History


Past Medical History: Atrial Fibrillation, Asthma, GERD/Reflux, Supraventricular

Tachycardia (SVT)


History of Any Multi-Drug Resistant Organisms: None Reported


Past Surgical History: Cardiac Ablation


Past Anesthesia/Blood Transfusion Reactions: Motion Sickness


Past Psychological History: No Psychological Hx Reported


Smoking Status: Former smoker


Past Alcohol Use History: Rare


Past Drug Use History: None Reported





- Past Family History


  ** Father


Family Medical History: Cancer





General Exam


Limitations: no limitations


General appearance: alert, in no apparent distress


Head exam: Present: atraumatic, normocephalic, normal inspection


Eye exam: Present: normal appearance, PERRL, EOMI.  Absent: scleral icterus, 

conjunctival injection, periorbital swelling


ENT exam: Present: normal exam, mucous membranes moist


Neck exam: Present: normal inspection.  Absent: tenderness, meningismus, 

lymphadenopathy


Respiratory exam: Present: normal lung sounds bilaterally.  Absent: respiratory 

distress, wheezes, rales, rhonchi, stridor


Cardiovascular Exam: Present: regular rate, normal rhythm, normal heart sounds. 

Absent: systolic murmur, diastolic murmur, rubs, gallop, clicks


GI/Abdominal exam: Present: soft, normal bowel sounds.  Absent: distended, 

tenderness, guarding, rebound, rigid


Extremities exam: Present: normal inspection, full ROM, normal capillary refill.

 Absent: tenderness, pedal edema, joint swelling, calf tenderness


Back exam: Present: normal inspection


Neurological exam: Present: alert, oriented X3, CN II-XII intact


Psychiatric exam: Present: normal affect, normal mood


Skin exam: Present: warm, dry, intact, normal color.  Absent: rash





Course


                                   Vital Signs











  09/14/24 09/14/24 09/14/24





  16:44 17:52 18:27


 


Temperature 102.5 F H 100.3 F H 


 


Pulse Rate 110 H 100 


 


Respiratory 20 20 20





Rate   


 


Blood Pressure 111/55  


 


O2 Sat by Pulse 97 95 





Oximetry   














  09/14/24 09/14/24





  18:38 19:27


 


Temperature 98.7 F 98.5 F


 


Pulse Rate 80 70


 


Respiratory 18 18





Rate  


 


Blood Pressure 120/68 105/66


 


O2 Sat by Pulse 96 96





Oximetry  














- Reevaluation(s)


Reevaluation #1: 





09/14/24 18:52


Medical records reviewed


Reevaluation #2: 





09/14/24 18:52


Patient symptoms improved


Reevaluation #3: 





09/14/24 18:53


Patient informed of results and questions answered


Reevaluation #4: 





Was pt. sent in by a medical professional or institution (, PA, NP, urgent 

care, hospital, or nursing home...) When possible be specific


@  -no


Did you speak to anyone other than the patient for history (EMS, parent, family,

police, friend...)? What history was obtained from this source 


@  -no


Did you review nursing and triage notes (agree or disagree)?  Why? 


@  -agree


Are old charts reviewed (outside hosp., previous admission, EMS record, old EKG,

old radiological studies, urgent care reports/EKG's, nursing home records)? 

Report findings 


@  -yes


Differential Diagnosis (chest pain, altered mental status, abdominal pain women,

abdominal pain men, vaginal bleeding, weakness, fever, dyspnea, syncope, 

headache, dizziness, GI bleed, back pain, seizure, CVA, palpatations, mental 

health, musculoskeletal)? 


@  -prior


EKG interpreted by me (3pts min.).


@  -yes


X-rays interpreted by me (1pt min.).


@  -yes negative for acute disease


CT interpreted by me (1pt min.).


@  -no


U/S interpreted by me (1pt. min.).


@  -no


What testing was considered but not performed or refused? (CT, X-rays, U/S, 

labs)? Why?


@  -none


What meds were considered but not given or refused? Why?


@  -none


Did you discuss the management of the patient with other professionals 

(professionals i.e. , PA, NP, lab, RT, psych nurse, , , 

teacher, , )? Give summary


@  -no


Was smoking cessation discussed for >3mins.?


@  -no


Was critical care preformed (if so, how long)?


@  -no


Were there social determinants of health that impacted care today? How? 

(Homelessness, low income, unemployed, alcoholism, drug addiction, 

transportation, low edu. Level, literacy, decrease access to med. care, senior living, 

rehab)?


@  -none


Was there de-escalation of care discussed even if they declined (Discuss DNR or 

withdrawal of care, Hospice)? DNR status


@  -no


What co-morbidities impacted this encounter? (DM, HTN, Smoking, COPD, CAD, 

Cancer, CVA, ARF, Chemo, Hep., AIDS, mental health diagnosis, sleep apnea, 

morbid obesity)?


@  -none


Was patient admitted / discharged? Hospital course, mention meds given and 

route, prescriptions, significant lab abnormalities, going to OR and other 

pertinent info.


@  - 34 male to the ER for evaluation of fever and sore throat, patient has 

negative viral testing negative strep but will treat with antibiotics for 

pharyngitis


Discharged


Undiagnosed new problem with uncertain prognosis?


@  -no


Drug Therapy requiring intensive monitoring for toxicity (Heparin, Nitro, 

Insulin, Cardizem)?


@  -no


Were any procedures done?


@  -no


Diagnosis/symptom?


@  -Pharyngitis


Acute, or Chronic, or Acute on Chronic?


@  -Acute


Uncomplicated (without systemic symptoms) or Complicated (systemic symptoms)?


@  -Complicated


Side effects of treatment?


@  -no


Exacerbation, Progression, or Severe Exacerbation?


@  -exacerbation


Poses a threat to life or bodily function? How? (Chest pain, USA, MI, pneumonia,

PE, COPD, DKA, ARF, appy, cholecystitis, CVA, Diverticulitis, Homicidal, 

Suicidal, threat to staff... and all critical care pts)


@  -no


Reevaluation #5: 





Differential Fever:


Pneumonia, viral URI, endocarditis, myocarditis, pericarditis, otitis, 

sinusitis, peritonsillar Abscess, retropharyngeal Abscess, epiglottitis, 

peritonitis, appendicitis, Macarena cystitis, diverticulitis, hepatitis, colitis, 

UTI, PID, TOA, pyelonephritis, prostatitis, epididymitis, meningitis, 

encephalitis, pulmonary embolism, CVA, thyroid storm, pancreatitis, adrenal 

crisis, cavernous sinus thrombosis, this is not meant to be an all-inclusive 

list. 








Medical Decision Making





- Medical Decision Making





4 male to the ER for evaluation of fever and sore throat, patient has negative 

viral testing negative strep but will treat with antibiotics for pharyngitis





- Lab Data


Result diagrams: 


                                 09/14/24 18:05





                                 09/14/24 18:05


                                   Lab Results











  09/14/24 09/14/24 09/14/24 Range/Units





  17:05 17:05 18:03 


 


WBC     (3.8-10.6)  k/uL


 


RBC     (4.30-5.90)  m/uL


 


Hgb     (13.0-17.5)  gm/dL


 


Hct     (39.0-53.0)  %


 


MCV     (80.0-100.0)  fL


 


MCH     (25.0-35.0)  pg


 


MCHC     (31.0-37.0)  g/dL


 


RDW     (11.5-15.5)  %


 


Plt Count     (150-450)  k/uL


 


MPV     


 


Neutrophils %     %


 


Lymphocytes %     %


 


Monocytes %     %


 


Eosinophils %     %


 


Basophils %     %


 


Neutrophils #     (1.3-7.7)  k/uL


 


Lymphocytes #     (1.0-4.8)  k/uL


 


Monocytes #     (0-1.0)  k/uL


 


Eosinophils #     (0-0.7)  k/uL


 


Basophils #     (0-0.2)  k/uL


 


PT     (10.0-12.5)  sec


 


INR     (<1.2)  


 


APTT     (22.0-30.0)  sec


 


Sodium     (137-145)  mmol/L


 


Potassium     (3.5-5.1)  mmol/L


 


Chloride     ()  mmol/L


 


Carbon Dioxide     (22-30)  mmol/L


 


Anion Gap     mmol/L


 


BUN     (9-20)  mg/dL


 


Creatinine     (0.66-1.25)  mg/dL


 


Est GFR (CKD-EPI)AfAm     (>60 ml/min/1.73 sqM)  


 


Est GFR (CKD-EPI)NonAf     (>60 ml/min/1.73 sqM)  


 


Glucose     (74-99)  mg/dL


 


Plasma Lactic Acid Ant     (0.7-2.0)  mmol/L


 


Calcium     (8.4-10.2)  mg/dL


 


Phosphorus     (2.5-4.5)  mg/dL


 


Magnesium     (1.6-2.3)  mg/dL


 


Total Bilirubin     (0.2-1.3)  mg/dL


 


AST     (17-59)  U/L


 


ALT     (4-49)  U/L


 


Alkaline Phosphatase     ()  U/L


 


Troponin I     (0.000-0.034)  ng/mL


 


NT-Pro-B Natriuret Pep     pg/mL


 


Total Protein     (6.3-8.2)  g/dL


 


Albumin     (3.5-5.0)  g/dL


 


Urine Color    Orange  


 


Urine Appearance    Cloudy  (Clear)  


 


Urine pH    5.5  (5.0-8.0)  


 


Ur Specific Gravity    1.028  (1.001-1.035)  


 


Urine Protein    2+ H  (Negative)  


 


Urine Glucose (UA)    Trace H  (Negative)  


 


Urine Ketones    Negative  (Negative)  


 


Urine Blood    Moderate H  (Negative)  


 


Urine Nitrite    Negative  (Negative)  


 


Urine Bilirubin    1+ H  (Negative)  


 


Urine Urobilinogen    6.0  (<2.0)  mg/dL


 


Ur Leukocyte Esterase    Negative  (Negative)  


 


Urine RBC    12 H  (0-5)  /hpf


 


Urine WBC    8 H  (0-5)  /hpf


 


Ur Squamous Epith Cells    <1  (0-4)  /hpf


 


Urine Bacteria    Rare H  (None)  /hpf


 


Cellular Casts    4  (0)  /lpf


 


Hyaline Casts    11 H  (0-2)  /lpf


 


Urine Mucus    Many H  (None)  /hpf


 


Influenza Type A (PCR)  Not Detected    (Not Detectd)  


 


Influenza Type B (PCR)  Not Detected    (Not Detectd)  


 


RSV (PCR)  Not Detected    (Not Detectd)  


 


SARS-CoV-2 (PCR)  Not Detected    (Not Detectd)  


 


Group A Strep (PCR)   NOT DETECTED   (Not Detectd)  














  09/14/24 09/14/24 09/14/24 Range/Units





  18:05 18:05 18:05 


 


WBC  11.2 H    (3.8-10.6)  k/uL


 


RBC  5.16    (4.30-5.90)  m/uL


 


Hgb  14.0    (13.0-17.5)  gm/dL


 


Hct  43.0    (39.0-53.0)  %


 


MCV  83.4    (80.0-100.0)  fL


 


MCH  27.2    (25.0-35.0)  pg


 


MCHC  32.6    (31.0-37.0)  g/dL


 


RDW  12.8    (11.5-15.5)  %


 


Plt Count  342    (150-450)  k/uL


 


MPV  7.4    


 


Neutrophils %  77    %


 


Lymphocytes %  11    %


 


Monocytes %  9    %


 


Eosinophils %  0    %


 


Basophils %  0    %


 


Neutrophils #  8.6 H    (1.3-7.7)  k/uL


 


Lymphocytes #  1.2    (1.0-4.8)  k/uL


 


Monocytes #  1.0    (0-1.0)  k/uL


 


Eosinophils #  0.0    (0-0.7)  k/uL


 


Basophils #  0.0    (0-0.2)  k/uL


 


PT   11.3   (10.0-12.5)  sec


 


INR   1.0   (<1.2)  


 


APTT   28.7   (22.0-30.0)  sec


 


Sodium    131 L  (137-145)  mmol/L


 


Potassium    3.6  (3.5-5.1)  mmol/L


 


Chloride    92 L  ()  mmol/L


 


Carbon Dioxide    28  (22-30)  mmol/L


 


Anion Gap    11  mmol/L


 


BUN    20  (9-20)  mg/dL


 


Creatinine    1.83 H  (0.66-1.25)  mg/dL


 


Est GFR (CKD-EPI)AfAm    54  (>60 ml/min/1.73 sqM)  


 


Est GFR (CKD-EPI)NonAf    47  (>60 ml/min/1.73 sqM)  


 


Glucose    118 H  (74-99)  mg/dL


 


Plasma Lactic Acid Ant     (0.7-2.0)  mmol/L


 


Calcium    9.1  (8.4-10.2)  mg/dL


 


Phosphorus    3.3  (2.5-4.5)  mg/dL


 


Magnesium    1.8  (1.6-2.3)  mg/dL


 


Total Bilirubin    1.3  (0.2-1.3)  mg/dL


 


AST    91 H  (17-59)  U/L


 


ALT    159 H  (4-49)  U/L


 


Alkaline Phosphatase    56  ()  U/L


 


Troponin I     (0.000-0.034)  ng/mL


 


NT-Pro-B Natriuret Pep    <20  pg/mL


 


Total Protein    7.2  (6.3-8.2)  g/dL


 


Albumin    3.8  (3.5-5.0)  g/dL


 


Urine Color     


 


Urine Appearance     (Clear)  


 


Urine pH     (5.0-8.0)  


 


Ur Specific Gravity     (1.001-1.035)  


 


Urine Protein     (Negative)  


 


Urine Glucose (UA)     (Negative)  


 


Urine Ketones     (Negative)  


 


Urine Blood     (Negative)  


 


Urine Nitrite     (Negative)  


 


Urine Bilirubin     (Negative)  


 


Urine Urobilinogen     (<2.0)  mg/dL


 


Ur Leukocyte Esterase     (Negative)  


 


Urine RBC     (0-5)  /hpf


 


Urine WBC     (0-5)  /hpf


 


Ur Squamous Epith Cells     (0-4)  /hpf


 


Urine Bacteria     (None)  /hpf


 


Cellular Casts     (0)  /lpf


 


Hyaline Casts     (0-2)  /lpf


 


Urine Mucus     (None)  /hpf


 


Influenza Type A (PCR)     (Not Detectd)  


 


Influenza Type B (PCR)     (Not Detectd)  


 


RSV (PCR)     (Not Detectd)  


 


SARS-CoV-2 (PCR)     (Not Detectd)  


 


Group A Strep (PCR)     (Not Detectd)  














  09/14/24 09/14/24 Range/Units





  18:05 18:05 


 


WBC    (3.8-10.6)  k/uL


 


RBC    (4.30-5.90)  m/uL


 


Hgb    (13.0-17.5)  gm/dL


 


Hct    (39.0-53.0)  %


 


MCV    (80.0-100.0)  fL


 


MCH    (25.0-35.0)  pg


 


MCHC    (31.0-37.0)  g/dL


 


RDW    (11.5-15.5)  %


 


Plt Count    (150-450)  k/uL


 


MPV    


 


Neutrophils %    %


 


Lymphocytes %    %


 


Monocytes %    %


 


Eosinophils %    %


 


Basophils %    %


 


Neutrophils #    (1.3-7.7)  k/uL


 


Lymphocytes #    (1.0-4.8)  k/uL


 


Monocytes #    (0-1.0)  k/uL


 


Eosinophils #    (0-0.7)  k/uL


 


Basophils #    (0-0.2)  k/uL


 


PT    (10.0-12.5)  sec


 


INR    (<1.2)  


 


APTT    (22.0-30.0)  sec


 


Sodium    (137-145)  mmol/L


 


Potassium    (3.5-5.1)  mmol/L


 


Chloride    ()  mmol/L


 


Carbon Dioxide    (22-30)  mmol/L


 


Anion Gap    mmol/L


 


BUN    (9-20)  mg/dL


 


Creatinine    (0.66-1.25)  mg/dL


 


Est GFR (CKD-EPI)AfAm    (>60 ml/min/1.73 sqM)  


 


Est GFR (CKD-EPI)NonAf    (>60 ml/min/1.73 sqM)  


 


Glucose    (74-99)  mg/dL


 


Plasma Lactic Acid Ant  1.3   (0.7-2.0)  mmol/L


 


Calcium    (8.4-10.2)  mg/dL


 


Phosphorus    (2.5-4.5)  mg/dL


 


Magnesium    (1.6-2.3)  mg/dL


 


Total Bilirubin    (0.2-1.3)  mg/dL


 


AST    (17-59)  U/L


 


ALT    (4-49)  U/L


 


Alkaline Phosphatase    ()  U/L


 


Troponin I   <0.012  (0.000-0.034)  ng/mL


 


NT-Pro-B Natriuret Pep    pg/mL


 


Total Protein    (6.3-8.2)  g/dL


 


Albumin    (3.5-5.0)  g/dL


 


Urine Color    


 


Urine Appearance    (Clear)  


 


Urine pH    (5.0-8.0)  


 


Ur Specific Gravity    (1.001-1.035)  


 


Urine Protein    (Negative)  


 


Urine Glucose (UA)    (Negative)  


 


Urine Ketones    (Negative)  


 


Urine Blood    (Negative)  


 


Urine Nitrite    (Negative)  


 


Urine Bilirubin    (Negative)  


 


Urine Urobilinogen    (<2.0)  mg/dL


 


Ur Leukocyte Esterase    (Negative)  


 


Urine RBC    (0-5)  /hpf


 


Urine WBC    (0-5)  /hpf


 


Ur Squamous Epith Cells    (0-4)  /hpf


 


Urine Bacteria    (None)  /hpf


 


Cellular Casts    (0)  /lpf


 


Hyaline Casts    (0-2)  /lpf


 


Urine Mucus    (None)  /hpf


 


Influenza Type A (PCR)    (Not Detectd)  


 


Influenza Type B (PCR)    (Not Detectd)  


 


RSV (PCR)    (Not Detectd)  


 


SARS-CoV-2 (PCR)    (Not Detectd)  


 


Group A Strep (PCR)    (Not Detectd)  














- EKG Data


-: EKG Interpreted by Me (EKG is sinus 88   QTc 397)





- Radiology Data


Radiology results: report reviewed (Chest x-ray is negative for acute disease), 

image reviewed





Disposition


Clinical Impression: 


 Fever, Pharyngitis





Disposition: HOME SELF-CARE


Condition: Fair


Instructions (If sedation given, give patient instructions):  Pharyngitis (ED), 

Fever in Adults (ED)


Prescriptions: 


Amoxic-Pot Clav 875-125Mg [Augmentin 875-125] 1 tab PO BID 10 Days #20 tab


Is patient prescribed a controlled substance at d/c from ED?: No


Referrals: 


Roge Bradford MD [Primary Care Provider] - 1-2 days


Time of Disposition: 19:00

## 2024-09-18 ENCOUNTER — HOSPITAL ENCOUNTER (EMERGENCY)
Dept: HOSPITAL 47 - EC | Age: 34
Discharge: HOME | End: 2024-09-18
Payer: COMMERCIAL

## 2024-09-18 VITALS — SYSTOLIC BLOOD PRESSURE: 114 MMHG | DIASTOLIC BLOOD PRESSURE: 55 MMHG | HEART RATE: 80 BPM | TEMPERATURE: 98.8 F

## 2024-09-18 VITALS — RESPIRATION RATE: 18 BRPM

## 2024-09-18 LAB
ALBUMIN SERPL-MCNC: 4 G/DL (ref 3.5–5)
ALP SERPL-CCNC: 64 U/L (ref 38–126)
ALT SERPL-CCNC: 176 U/L (ref 4–49)
ANION GAP SERPL CALC-SCNC: 8 MMOL/L
AST SERPL-CCNC: 91 U/L (ref 17–59)
BASOPHILS # BLD AUTO: 0.1 K/UL (ref 0–0.2)
BASOPHILS NFR BLD AUTO: 1 %
BUN SERPL-SCNC: 13 MG/DL (ref 9–20)
CALCIUM SPEC-MCNC: 9.4 MG/DL (ref 8.4–10.2)
CHLORIDE SERPL-SCNC: 95 MMOL/L (ref 98–107)
CO2 SERPL-SCNC: 32 MMOL/L (ref 22–30)
EOSINOPHIL # BLD AUTO: 0.2 K/UL (ref 0–0.7)
EOSINOPHIL NFR BLD AUTO: 2 %
ERYTHROCYTE [DISTWIDTH] IN BLOOD BY AUTOMATED COUNT: 5.41 M/UL (ref 4.3–5.9)
ERYTHROCYTE [DISTWIDTH] IN BLOOD: 12.7 % (ref 11.5–15.5)
GLUCOSE SERPL-MCNC: 110 MG/DL (ref 74–99)
HCT VFR BLD AUTO: 45.4 % (ref 39–53)
HGB BLD-MCNC: 14.8 GM/DL (ref 13–17.5)
LYMPHOCYTES # SPEC AUTO: 3.4 K/UL (ref 1–4.8)
LYMPHOCYTES NFR SPEC AUTO: 33 %
MCH RBC QN AUTO: 27.4 PG (ref 25–35)
MCHC RBC AUTO-ENTMCNC: 32.7 G/DL (ref 31–37)
MCV RBC AUTO: 83.9 FL (ref 80–100)
MONOCYTES # BLD AUTO: 0.5 K/UL (ref 0–1)
MONOCYTES NFR BLD AUTO: 5 %
NEUTROPHILS # BLD AUTO: 5.7 K/UL (ref 1.3–7.7)
NEUTROPHILS NFR BLD AUTO: 55 %
PLATELET # BLD AUTO: 366 K/UL (ref 150–450)
POTASSIUM SERPL-SCNC: 3.5 MMOL/L (ref 3.5–5.1)
PROT SERPL-MCNC: 7.6 G/DL (ref 6.3–8.2)
SODIUM SERPL-SCNC: 135 MMOL/L (ref 137–145)
WBC # BLD AUTO: 10.3 K/UL (ref 3.8–10.6)

## 2024-09-18 PROCEDURE — 99283 EMERGENCY DEPT VISIT LOW MDM: CPT

## 2024-09-18 PROCEDURE — 80053 COMPREHEN METABOLIC PANEL: CPT

## 2024-09-18 PROCEDURE — 87636 SARSCOV2 & INF A&B AMP PRB: CPT

## 2024-09-18 PROCEDURE — 96374 THER/PROPH/DIAG INJ IV PUSH: CPT

## 2024-09-18 PROCEDURE — 85025 COMPLETE CBC W/AUTO DIFF WBC: CPT

## 2024-09-18 PROCEDURE — 83605 ASSAY OF LACTIC ACID: CPT

## 2024-09-18 PROCEDURE — 87651 STREP A DNA AMP PROBE: CPT

## 2024-09-18 PROCEDURE — 86308 HETEROPHILE ANTIBODY SCREEN: CPT

## 2024-09-18 PROCEDURE — 96361 HYDRATE IV INFUSION ADD-ON: CPT

## 2024-09-18 PROCEDURE — 36415 COLL VENOUS BLD VENIPUNCTURE: CPT

## 2024-09-18 RX ADMIN — KETOROLAC TROMETHAMINE STA MG: 15 INJECTION, SOLUTION INTRAMUSCULAR; INTRAVENOUS at 18:03

## 2024-09-18 RX ADMIN — CEFAZOLIN STA MLS/HR: 330 INJECTION, POWDER, FOR SOLUTION INTRAMUSCULAR; INTRAVENOUS at 18:03

## 2024-09-18 NOTE — ED
Recheck HPI





- General


Chief Complaint: Recheck/Abnormal Lab/Rx


Stated Complaint: muscle aches, mouth swelling


Time Seen by Provider: 09/18/24 16:20


Source: patient, RN notes reviewed


Mode of arrival: ambulatory


Limitations: no limitations





- History of Present Illness


Initial Comments: 


34-year-old male presented to ER with a chief complaint of dizziness and 

myalgias.  Patient was seen here on Saturday for evaluation of sore throat, 

cough, fever, congestion and dizziness.  Patient was diagnosed with pharyngitis 

and started on Augmentin.  Patient states since then he has been having an 

increase in symptoms and feels like he is not getting any better.  He reports 

multiple sores in his mouth limited ability to eat.  Patient describes the 

dizziness as room spinning sensation which is worse with positional changes.  No

history of vertigo.  Denies any chest pain, shortness of breath, nausea, 

vomiting, abdominal pain, constipation/diarrhea or peripheral edema.  





- Related Data


                                Home Medications











 Medication  Instructions  Recorded  Confirmed


 


Albuterol Inhaler [Ventolin Hfa 1 - 2 puff INHALATION RT-Q6H PRN 05/19/22 11/ 14/22





Inhaler]   


 


Metoprolol Succinate (ER) [Toprol 50 mg PO DAILY 11/14/22 11/14/22





Xl]   








                                  Previous Rx's











 Medication  Instructions  Recorded


 


Ibuprofen [Motrin] 800 mg PO Q6HR #30 tab 11/14/22


 


methylPREDNISolone [Medrol Dose 4 mg PO AS DIRECTED #1 packet 11/14/22





Pack]  


 


Amoxic-Pot Clav 875-125Mg 1 tab PO BID 10 Days #20 tab 09/14/24





[Augmentin 875-125]  


 


Ketorolac [Toradol] 10 mg PO Q6HR #20 tab 09/18/24


 


Nystatin [Nystatin Oral Susp] 4 ml PO QID #200 ml 09/18/24











                                    Allergies











Allergy/AdvReac Type Severity Reaction Status Date / Time


 


No Known Allergies Allergy   Verified 09/18/24 16:01














Review of Systems


ROS Statement: 


Those systems with pertinent positive or pertinent negative responses have been 

documented in the HPI.





ROS Other: All systems not noted in ROS Statement are negative.





Past Medical History


Past Medical History: Atrial Fibrillation, Asthma, GERD/Reflux, Supraventricular

Tachycardia (SVT)


History of Any Multi-Drug Resistant Organisms: None Reported


Past Surgical History: Cardiac Ablation


Past Anesthesia/Blood Transfusion Reactions: Motion Sickness


Past Psychological History: No Psychological Hx Reported


Smoking Status: Former smoker


Past Alcohol Use History: Rare


Past Drug Use History: None Reported





- Past Family History


  ** Father


Family Medical History: Cancer





General Exam





- General Exam Comments


Initial Comments: 





Visual Physical Exam





Vital signs reviewed





General: Well-appearing, nontoxic, no acute distress.


Head: Normocephalic, atraumatic


Eyes: PERRLA, EOMI


ENT: Airway patent


Chest: Nonlabored breathing


Skin: No visual rash, normal skin tone


Neuro: Alert and oriented 3


Musculoskeletal: No gross abnormalities





Limitations: no limitations


General appearance: alert, in no apparent distress


ENT exam: Present: mucous membranes moist, other (White plaque on tongue.  There

is white exudates through posterior oropharynx.)


Neck exam: Present: normal inspection.  Absent: tenderness, meningismus, 

lymphadenopathy


Respiratory exam: Present: normal lung sounds bilaterally.  Absent: respiratory 

distress, wheezes, rales, rhonchi, stridor


Cardiovascular Exam: Present: regular rate, normal rhythm, normal heart sounds. 

Absent: systolic murmur, diastolic murmur, rubs, gallop, clicks


GI/Abdominal exam: Present: soft, normal bowel sounds.  Absent: distended, 

tenderness, guarding, rebound, rigid


Extremities exam: Present: normal inspection, full ROM, normal capillary refill.

 Absent: tenderness, pedal edema, joint swelling, calf tenderness


Neurological exam: Present: alert, oriented X3, CN II-XII intact


Skin exam: Present: warm, dry, intact, normal color.  Absent: rash





Course


                                   Vital Signs











  09/18/24 09/18/24





  15:58 19:58


 


Temperature 97.9 F 98.8 F


 


Pulse Rate 103 H 80


 


Respiratory 18 18





Rate  


 


Blood Pressure 118/79 114/55


 


O2 Sat by Pulse 99 99





Oximetry  














Medical Decision Making





- Medical Decision Making





I performed the quick note portion of this chart.  Electronically signed by  

Juan Caldera PA-C





Was pt. sent in by a medical professional or institution ([facility] Joy, NP, urgent 

care, hospital, or nursing home...) When possible be specific


@  -No


Did you speak to anyone other than the patient for history (EMS, parent, family,

police, friend...)? What history was obtained from this source 


@  -No


Did you review nursing and triage notes (agree or disagree)?  Why? 


@  -I reviewed and agree with nursing and triage notes


Were old charts reviewed (outside hosp., previous admission, EMS record, old 

EKG, old radiological studies, urgent care reports/EKG's, nursing home records)?

Report findings 


@  -I reviewed ER visit from 9-.  Patient seen here for similar symptoms 

and diagnosed with pharyngitis.  Patient started on Augmentin.


Differential Diagnosis (chest pain, altered mental status, abdominal pain women,

abdominal pain men, vaginal bleeding, weakness, fever, dyspnea, syncope, 

headache, dizziness, GI bleed, back pain, seizure, CVA, palpatations, mental 

health, musculoskeletal)? 


@  -Differential Dizziness: Benign paroxysmal positional Vertigo, Meniere's 

disease, otitis media, acoustic neuroma, vertebrobasilar insufficiency, 

cerebellar stroke, encephalitis, hypovolemic, arrhythmia, coronary artery 

syndrome, anemia, this is not meant to be an all-inclusive list


EKG interpreted by me (3pts min.).


@  -None done


X-rays interpreted by me (1pt min.).


@  -None done


CT interpreted by me (1pt min.).


@  -None done


U/S interpreted by me (1pt. min.).


@  -None done


What testing was considered but not performed or refused? (CT, X-rays, U/S, 

labs)? Why?


@  -None


What meds were considered but not given or refused? Why?


@  -None


Did you discuss the management of the patient with other professionals 

(professionals i.e. , PA, NP, lab, RT, psych nurse, , , 

teacher, , )? Give summary


@  -No


Was smoking cessation discussed for >3mins.?


@  -No


Was critical care preformed (if so, how long)?


@  -No


Were there social determinants of health that impacted care today? How? 

(Homelessness, low income, unemployed, alcoholism, drug addiction, transportati

on, low edu. Level, literacy, decrease access to med. care, retirement, rehab)?


@  -No


Was there de-escalation of care discussed even if they declined (Discuss DNR or 

withdrawal of care, Hospice)? DNR status


@  -No


What co-morbidities impacted this encounter? (DM, HTN, Smoking, COPD, CAD, 

Cancer, CVA, ARF, Chemo, Hep., AIDS, mental health diagnosis, sleep apnea, 

morbid obesity)?


@  -Obese


Was patient admitted / discharged? Hospital course, mention meds given and 

route, prescriptions, significant lab abnormalities, going to OR and other 

pertinent info.


@  -Discharge.  34-year-old male presented to the ER with a chief complaint of 

dizziness and mouth soreness.Patient originally seen by myself as a quick note. 

Patient seen here on 9- for similar complaints.  History and physical 

exam completed.  Vitals within normal limits.  Patient in no signs of acute 

distress and resting comfortably on stretcher.  Exam remarkable for a white 

thick plaque on tongue.  There is also multiple exudates and blisters throughout

oropharynx.  Oropharynx patent. CBC unremarkable.  CMP showing sodium 135, pot

assium 3.5, chloride 95, carbon dioxide 32.  Viral swabs negative.  Heterophile 

negative.  Strep negative.  Patient received 1 L IV fluids and Toradol for 

symptom control in the ER.  Upon reevaluation, patient reporting improvement of 

symptoms.  Results discussed with patient, all questions answered.  Nystatin 

oral suspension and Toradol prescribed.  Advise close follow-up with PCP.  I 

also instructed patient to continue taking Augmentin as prescribed.  Strict 

return parameters discussed.  Patient discharged in stable condition.  Patient 

verbally expressed understanding agree with care plan.  Case discussed with the 

attending, Dr. Hedrick.


Undiagnosed new problem with uncertain prognosis?


@  -No


Drug Therapy requiring intensive monitoring for toxicity (Heparin, Nitro, 

Insulin, Cardizem)?


@  -No


Were any procedures done?


@  -No


Diagnosis/symptom?


@  -Thrush/pharyngitis


Acute, or Chronic, or Acute on Chronic?


@  -Acute


Uncomplicated (without systemic symptoms) or Complicated (systemic symptoms)?


@  -Uncomplicated


Side effects of treatment?


@  -No


Exacerbation, Progression, or Severe Exacerbation?


@  -No


Poses a threat to life or bodily function? How? (Chest pain, USA, MI, pneumonia,

PE, COPD, DKA, ARF, appy, cholecystitis, CVA, Diverticulitis, Homicidal, Suici

bret, threat to staff... and all critical care pts)


@  -No








- Lab Data


Result diagrams: 


                                 09/18/24 16:51





                                 09/18/24 16:51


                                   Lab Results











  09/18/24 09/18/24 09/18/24 Range/Units





  16:51 16:51 16:51 


 


WBC  10.3    (3.8-10.6)  k/uL


 


RBC  5.41    (4.30-5.90)  m/uL


 


Hgb  14.8    (13.0-17.5)  gm/dL


 


Hct  45.4    (39.0-53.0)  %


 


MCV  83.9    (80.0-100.0)  fL


 


MCH  27.4    (25.0-35.0)  pg


 


MCHC  32.7    (31.0-37.0)  g/dL


 


RDW  12.7    (11.5-15.5)  %


 


Plt Count  366    (150-450)  k/uL


 


MPV  7.0    


 


Neutrophils %  55    %


 


Lymphocytes %  33    %


 


Monocytes %  5    %


 


Eosinophils %  2    %


 


Basophils %  1    %


 


Neutrophils #  5.7    (1.3-7.7)  k/uL


 


Lymphocytes #  3.4    (1.0-4.8)  k/uL


 


Monocytes #  0.5    (0-1.0)  k/uL


 


Eosinophils #  0.2    (0-0.7)  k/uL


 


Basophils #  0.1    (0-0.2)  k/uL


 


Sodium   135 L   (137-145)  mmol/L


 


Potassium   3.5   (3.5-5.1)  mmol/L


 


Chloride   95 L   ()  mmol/L


 


Carbon Dioxide   32 H   (22-30)  mmol/L


 


Anion Gap   8   mmol/L


 


BUN   13   (9-20)  mg/dL


 


Creatinine   0.88   (0.66-1.25)  mg/dL


 


Est GFR (CKD-EPI)AfAm   >90   (>60 ml/min/1.73 sqM)  


 


Est GFR (CKD-EPI)NonAf   >90   (>60 ml/min/1.73 sqM)  


 


Glucose   110 H   (74-99)  mg/dL


 


Plasma Lactic Acid Ant    1.0  (0.7-2.0)  mmol/L


 


Calcium   9.4   (8.4-10.2)  mg/dL


 


Total Bilirubin   1.2   (0.2-1.3)  mg/dL


 


AST   91 H   (17-59)  U/L


 


ALT   176 H   (4-49)  U/L


 


Alkaline Phosphatase   64   ()  U/L


 


Total Protein   7.6   (6.3-8.2)  g/dL


 


Albumin   4.0   (3.5-5.0)  g/dL


 


Heterophile Antibody     (Negative)  


 


Influenza Type A (PCR)     (Not Detectd)  


 


Influenza Type B (PCR)     (Not Detectd)  


 


RSV (PCR)     (Not Detectd)  


 


SARS-CoV-2 (PCR)     (Not Detectd)  


 


Group A Strep (PCR)     (Not Detectd)  














  09/18/24 09/18/24 09/18/24 Range/Units





  16:51 17:35 17:35 


 


WBC     (3.8-10.6)  k/uL


 


RBC     (4.30-5.90)  m/uL


 


Hgb     (13.0-17.5)  gm/dL


 


Hct     (39.0-53.0)  %


 


MCV     (80.0-100.0)  fL


 


MCH     (25.0-35.0)  pg


 


MCHC     (31.0-37.0)  g/dL


 


RDW     (11.5-15.5)  %


 


Plt Count     (150-450)  k/uL


 


MPV     


 


Neutrophils %     %


 


Lymphocytes %     %


 


Monocytes %     %


 


Eosinophils %     %


 


Basophils %     %


 


Neutrophils #     (1.3-7.7)  k/uL


 


Lymphocytes #     (1.0-4.8)  k/uL


 


Monocytes #     (0-1.0)  k/uL


 


Eosinophils #     (0-0.7)  k/uL


 


Basophils #     (0-0.2)  k/uL


 


Sodium     (137-145)  mmol/L


 


Potassium     (3.5-5.1)  mmol/L


 


Chloride     ()  mmol/L


 


Carbon Dioxide     (22-30)  mmol/L


 


Anion Gap     mmol/L


 


BUN     (9-20)  mg/dL


 


Creatinine     (0.66-1.25)  mg/dL


 


Est GFR (CKD-EPI)AfAm     (>60 ml/min/1.73 sqM)  


 


Est GFR (CKD-EPI)NonAf     (>60 ml/min/1.73 sqM)  


 


Glucose     (74-99)  mg/dL


 


Plasma Lactic Acid Ant     (0.7-2.0)  mmol/L


 


Calcium     (8.4-10.2)  mg/dL


 


Total Bilirubin     (0.2-1.3)  mg/dL


 


AST     (17-59)  U/L


 


ALT     (4-49)  U/L


 


Alkaline Phosphatase     ()  U/L


 


Total Protein     (6.3-8.2)  g/dL


 


Albumin     (3.5-5.0)  g/dL


 


Heterophile Antibody  Negative    (Negative)  


 


Influenza Type A (PCR)    Not Detected  (Not Detectd)  


 


Influenza Type B (PCR)    Not Detected  (Not Detectd)  


 


RSV (PCR)    Not Detected  (Not Detectd)  


 


SARS-CoV-2 (PCR)    Not Detected  (Not Detectd)  


 


Group A Strep (PCR)   NOT DETECTED   (Not Detectd)  














Disposition


Clinical Impression: 


 Pharyngitis, Thrush





Disposition: HOME SELF-CARE


Condition: Stable


Instructions (If sedation given, give patient instructions):  Pharyngitis (ED), 

Oral Candidiasis (ED)


Additional Instructions: 


Please follow-up with PCP.  Continue taking Augmentin as prescribed.  Take 

nystatin and Toradol as prescribed.  Return to the ER for any new or worsening 

concerns.


Prescriptions: 


Nystatin [Nystatin Oral Susp] 4 ml PO QID #200 ml


Ketorolac [Toradol] 10 mg PO Q6HR #20 tab


Is patient prescribed a controlled substance at d/c from ED?: No


Referrals: 


Roge Bradford MD [Primary Care Provider] - 1-2 days


Time of Disposition: 19:38

## 2024-10-11 ENCOUNTER — HOSPITAL ENCOUNTER (OUTPATIENT)
Dept: HOSPITAL 47 - RADUSWWP | Age: 34
Discharge: HOME | End: 2024-10-11
Attending: FAMILY MEDICINE
Payer: COMMERCIAL

## 2024-10-11 PROCEDURE — 76705 ECHO EXAM OF ABDOMEN: CPT

## 2024-10-11 NOTE — US
EXAMINATION TYPE: US abdomen complete

 

DATE OF EXAM: 10/11/2024

 

COMPARISON: NONE

 

CLINICAL INDICATION: Male, 34 years old with history of R74.8 ELEVATED LIVER ENZYMES Z80.0 FAM HX PAN
CREA;

 

TECHNIQUE: Grayscale and color Doppler imaging of the abdomen was performed. 

 

FINDINGS:

 

EXAM MEASUREMENTS:

 

Liver Length:  22.2 cm   

Gallbladder Wall:  0.3 cm   

CBD:  n/a 

Right Kidney:  11.5 x 5.4 x 6.3 cm 

 

**Difficult and limited study due to patient body habitus

 

Pancreas:  limited by overlying midline bowel gas

Liver:  enlarged, increased attenuation, decreased visualization of vessels suggestive of fatty infil
trate  

Gallbladder:  visualized portions appear wnl

**Evidence for sonographic Augustine's sign:  no

CBD:  unable to visualize 

Right Kidney:  visualized portions appear wnl   

 

 

 

The intrahepatic portion of the IVC and proximal abdominal aorta are within normal limits.  There is 
no evidence of cholelithiasis.  Common bile duct is unremarkable.  The visualized portions of the pan
creas are homogenous.  The spleen is unremarkable.  Kidneys are symmetric and free of hydronephrosis.
  No renal lesions are seen.

 

IMPRESSION: 

Hepatomegaly with underlying hepatic steatosis.

 

[facility], Workstation: [id number], 10/11/2024 9:40 AM